# Patient Record
Sex: FEMALE | Race: WHITE
[De-identification: names, ages, dates, MRNs, and addresses within clinical notes are randomized per-mention and may not be internally consistent; named-entity substitution may affect disease eponyms.]

---

## 2019-07-08 ENCOUNTER — HOSPITAL ENCOUNTER (OUTPATIENT)
Dept: HOSPITAL 92 - BICULT | Age: 24
Discharge: HOME | End: 2019-07-08
Attending: ADVANCED PRACTICE MIDWIFE
Payer: COMMERCIAL

## 2019-07-08 DIAGNOSIS — N63.42: Primary | ICD-10-CM

## 2019-07-08 NOTE — ULT
LIMITED LEFT BREAST ULTARSOUND:

 

DATE: 7/8/2019.

 

PROVIDED CLINICAL HISTORY: 

Left breast lump.

 

FINDINGS: 

Limited sonographic interrogation was performed of the left breast in the region of palpable concern.
  The sonographic appearance of the breast parenchyma in this region is normal.  

 

IMPRESSION: 

No sonographic abnormality is evident in the region of palpable concern.  Negative imaging findings s
hould not preclude further evaluation of a clinically suspicious area.  The patient is referred back 
to her clinician.

 

POS: OFF